# Patient Record
Sex: FEMALE
[De-identification: names, ages, dates, MRNs, and addresses within clinical notes are randomized per-mention and may not be internally consistent; named-entity substitution may affect disease eponyms.]

---

## 2020-04-04 ENCOUNTER — HOSPITAL ENCOUNTER (EMERGENCY)
Dept: HOSPITAL 56 - MW.ED | Age: 30
Discharge: HOME | End: 2020-04-04
Payer: MEDICAID

## 2020-04-04 DIAGNOSIS — R10.9: ICD-10-CM

## 2020-04-04 DIAGNOSIS — F17.210: ICD-10-CM

## 2020-04-04 DIAGNOSIS — Z3A.01: ICD-10-CM

## 2020-04-04 DIAGNOSIS — O99.331: ICD-10-CM

## 2020-04-04 DIAGNOSIS — O99.89: Primary | ICD-10-CM

## 2020-04-04 LAB
BUN SERPL-MCNC: 13 MG/DL (ref 7–18)
CHLORIDE SERPL-SCNC: 106 MMOL/L (ref 98–107)
CO2 SERPL-SCNC: 25.9 MMOL/L (ref 21–32)
GLUCOSE SERPL-MCNC: 89 MG/DL (ref 74–106)
POTASSIUM SERPL-SCNC: 3.7 MMOL/L (ref 3.5–5.1)
SODIUM SERPL-SCNC: 140 MMOL/L (ref 136–145)

## 2020-04-04 NOTE — US
First trimester obstetrical ultrasound: Multiple real-time images were

 obtained transvaginally.

 

Comparison: No previous studies for current pregnancy.

 

Dates:

Current ultrasound: JOSE 12/01/20, gestational age 5 weeks 4 days

 

Single intrauterine gestation is seen.  Yolk sac and small fetal pole 

believed to be present.  Small subchorionic hemorrhage is noted.  

Maternal ovaries appear within normal limits.

 

Measurements:

Crown-rump length: 1.85 cm - out of range

Mean sac diameter: 0.95 cm - 5 weeks 4 days

Heart rate: 101 BPM

 

Impression:

1.  Single intrauterine gestation.  Dates as noted above.

2.  Small subchorionic hemorrhage is seen.

3.  Low heart rate most likely relating to early gestational age.

 

Diagnostic code #2

 

Study was dictated in MDT

## 2020-04-04 NOTE — EDM.PDOC
ED HPI GENERAL MEDICAL PROBLEM





- General


Chief Complaint: Abdominal Pain


Stated Complaint: RT SIDE ABDOMINAL


Time Seen by Provider: 20 16:09


Source of Information: Reports: Patient


History Limitations: Reports: No Limitations





- History of Present Illness


INITIAL COMMENTS - FREE TEXT/NARRATIVE: 


HISTORY AND PHYSICAL:





History of present illness:


Patient is a  29-year-old female who presents to the ED today with concern 

of right-sided abdominal pain that began over the past 1 to 2 days.  Patient 

states that she has had at home positive pregnancy test but has not had this 

confirmed in the clinic or by ultrasound but states she believes to be about 6 

weeks based off of her last menstrual cycle.  Patient states she began having 

sharp right-sided pain which she states radiates into her vagina. Patient 

denies any other symptoms or concerns.  Patient denies any vaginal bleeding.





Patient denies fever, chills, chest pain, shortness of breath, or cough. Denies 

headache, neck stiff ness, change in vision, syncope, or near syncope. Denies 

nausea, vomiting, diarrhea, constipation, or dysuria. Has not noted any blood 

in urine or stool. Patient has been eating and drinking appropriately.





Review of systems: 


As per history of present illness and below otherwise all systems reviewed and 

negative.





Past medical history: 


As per history of present illness and as reviewed below otherwise 

noncontributory.





Surgical history: 


As per history of present illness and as reviewed below otherwise 

noncontributory.





Social history: 


See social history for further information





Family history: 


As per history of present illness and as reviewed below otherwise 

noncontributory.





Physical exam:


General: Patient is alert, oriented, and in no acute distress. Patient sitting 

comfortably on exam table.


HEENT: Atraumatic, normocephalic, pupils equal and reactive bilaterally, 

negative for conjunctival pallor or scleral icterus, mucous membranes moist, 

TMs normal bilaterally, throat clear, neck supple, nontender, trachea midline. 

No drooling or trismus noted. No meningeal signs. No hot potato voice noted. 


Lungs: Clear to auscultation, breath sounds equal bilaterally, chest nontender.


Heart: S1S2, regular rate and rhythm without overt murmur


Abdomen: Soft, nondistended, nontender. Negative for masses or 

hepatosplenomegaly. Negative for costovertebral tenderness.


Pelvis: Stable nontender.


Genitourinary: Deferred.


Rectal: Deferred.


Skin: Intact, warm, dry. No lesions or rashes noted.


Extremities: Atraumatic, negative for cords or calf pain. Neurovascular 

unremarkable.


Neuro: Awake, alert, oriented. Cranial nerves II through XII unremarkable. 

Cerebellum unremarkable. Motor and sensory unremarkable throughout. Exam 

nonfocal.





Notes:


Abdomen of exam is non tender, and without peritoneal signs. 


Discussed importance for establishing care with an OB/GYN provider as well as 

primary care provider.


Voices understanding and is agreeable to plan of care. Denies any further 

questions or concerns at this time.





Diagnostics:


CBC, CMP, UA, Uhcg, Hcg quant, blood type/RH, TVUS





Therapeutics:


None





Prescription:


None





Impression: 


Abdominal pain, unspecified


Single intrauterine pregnancy





Plan:


1. Please start and/or continue to take your prenatal vitamin with folic acid 

once daily.


2. Tylenol as needed for pain management. This is safe to use in pregnancy.


3. Follow up/establish care with an OB/GYN as discussed. Return to the ED as 

needed and as discussed.








Definitive disposition and diagnosis as appropriate pending reevaluation and 

review of above.





  ** Right abdominal pain


Pain Score (Numeric/FACES): 6





- Related Data


 Allergies











Allergy/AdvReac Type Severity Reaction Status Date / Time


 


No Known Allergies Allergy   Verified 20 16:19











Home Meds: 


 Home Meds





. [No Known Home Meds]  20 [History]











Past Medical History


OB/GYN History: Reports: Pregnancy


Psychiatric History: Reports: Anxiety, Depression





Social & Family History





- Tobacco Use


Smoking Status *Q: Current Every Day Smoker


Years of Tobacco use: 10


Packs/Tins Daily: 0.5





- Caffeine Use


Caffeine Use: Reports: Coffee, Tea





- Recreational Drug Use


Recreational Drug Use: No





ED ROS GENERAL





- Review of Systems


Review Of Systems: Comprehensive ROS is negative, except as noted in HPI.





ED EXAM, GENERAL





- Physical Exam


Exam: See Below (see dictation)





Course





- Vital Signs


Last Recorded V/S: 


 Last Vital Signs











Temp  97.3 F   20 16:20


 


Pulse  71   20 18:12


 


Resp  18   20 18:12


 


BP  113/73   20 18:12


 


Pulse Ox  99   20 18:12














- Orders/Labs/Meds


Labs: 


 Laboratory Tests











  0420 Range/Units





  16:30 16:30 16:48 


 


WBC    9.22  (4.0-11.0)  K/uL


 


RBC    4.60  (4.30-5.90)  M/uL


 


Hgb    15.1  (12.0-16.0)  g/dL


 


Hct    44.7  (36.0-46.0)  %


 


MCV    97.2  (80.0-98.0)  fL


 


MCH    32.8 H  (27.0-32.0)  pg


 


MCHC    33.8  (31.0-37.0)  g/dL


 


RDW Std Deviation    43.2  (28.0-62.0)  fl


 


RDW Coeff of Xuan    12  (11.0-15.0)  %


 


Plt Count    284  (150-400)  K/uL


 


MPV    10.20  (7.40-12.00)  fL


 


Neut % (Auto)    66.3  (48.0-80.0)  %


 


Lymph % (Auto)    25.7  (16.0-40.0)  %


 


Mono % (Auto)    5.2  (0.0-15.0)  %


 


Eos % (Auto)    2.4  (0.0-7.0)  %


 


Baso % (Auto)    0.4  (0.0-1.5)  %


 


Neut # (Auto)    6.1 H  (1.4-5.7)  K/uL


 


Lymph # (Auto)    2.4  (0.6-2.4)  K/uL


 


Mono # (Auto)    0.5  (0.0-0.8)  K/uL


 


Eos # (Auto)    0.2  (0.0-0.7)  K/uL


 


Baso # (Auto)    0.0  (0.0-0.1)  K/uL


 


Nucleated RBC %    0.0  /100WBC


 


Nucleated RBCs #    0  K/uL


 


Sodium     (136-145)  mmol/L


 


Potassium     (3.5-5.1)  mmol/L


 


Chloride     ()  mmol/L


 


Carbon Dioxide     (21.0-32.0)  mmol/L


 


BUN     (7.0-18.0)  mg/dL


 


Creatinine     (0.6-1.0)  mg/dL


 


Est Cr Clr Drug Dosing     mL/min


 


Estimated GFR (MDRD)     ml/min


 


Glucose     ()  mg/dL


 


Calcium     (8.5-10.1)  mg/dL


 


Total Bilirubin     (0.2-1.0)  mg/dL


 


AST     (15-37)  IU/L


 


ALT     (14-63)  IU/L


 


Alkaline Phosphatase     ()  U/L


 


Total Protein     (6.4-8.2)  g/dL


 


Albumin     (3.4-5.0)  g/dL


 


Globulin     (2.6-4.0)  g/dL


 


Albumin/Globulin Ratio     (0.9-1.6)  


 


HCG, Quant     mIU/mL


 


Urine Color  YELLOW    


 


Urine Appearance  CLEAR    


 


Urine pH  7.0    (5.0-8.0)  


 


Ur Specific Gravity  1.010    (1.001-1.035)  


 


Urine Protein  NEGATIVE    (NEGATIVE)  mg/dL


 


Urine Glucose (UA)  NEGATIVE    (NEGATIVE)  mg/dL


 


Urine Ketones  NEGATIVE    (NEGATIVE)  mg/dL


 


Urine Occult Blood  NEGATIVE    (NEGATIVE)  


 


Urine Nitrite  NEGATIVE    (NEGATIVE)  


 


Urine Bilirubin  NEGATIVE    (NEGATIVE)  


 


Urine Urobilinogen  0.2    (<2.0)  EU/dL


 


Ur Leukocyte Esterase  NEGATIVE    (NEGATIVE)  


 


Urine HCG, Qual   POSITIVE   (NEGATIVE)  


 


Blood Type     














  20 Range/Units





  16:48 16:48 


 


WBC    (4.0-11.0)  K/uL


 


RBC    (4.30-5.90)  M/uL


 


Hgb    (12.0-16.0)  g/dL


 


Hct    (36.0-46.0)  %


 


MCV    (80.0-98.0)  fL


 


MCH    (27.0-32.0)  pg


 


MCHC    (31.0-37.0)  g/dL


 


RDW Std Deviation    (28.0-62.0)  fl


 


RDW Coeff of Xuan    (11.0-15.0)  %


 


Plt Count    (150-400)  K/uL


 


MPV    (7.40-12.00)  fL


 


Neut % (Auto)    (48.0-80.0)  %


 


Lymph % (Auto)    (16.0-40.0)  %


 


Mono % (Auto)    (0.0-15.0)  %


 


Eos % (Auto)    (0.0-7.0)  %


 


Baso % (Auto)    (0.0-1.5)  %


 


Neut # (Auto)    (1.4-5.7)  K/uL


 


Lymph # (Auto)    (0.6-2.4)  K/uL


 


Mono # (Auto)    (0.0-0.8)  K/uL


 


Eos # (Auto)    (0.0-0.7)  K/uL


 


Baso # (Auto)    (0.0-0.1)  K/uL


 


Nucleated RBC %    /100WBC


 


Nucleated RBCs #    K/uL


 


Sodium  140   (136-145)  mmol/L


 


Potassium  3.7   (3.5-5.1)  mmol/L


 


Chloride  106   ()  mmol/L


 


Carbon Dioxide  25.9   (21.0-32.0)  mmol/L


 


BUN  13   (7.0-18.0)  mg/dL


 


Creatinine  0.7   (0.6-1.0)  mg/dL


 


Est Cr Clr Drug Dosing  91.09   mL/min


 


Estimated GFR (MDRD)  > 60.0   ml/min


 


Glucose  89   ()  mg/dL


 


Calcium  9.2   (8.5-10.1)  mg/dL


 


Total Bilirubin  0.4   (0.2-1.0)  mg/dL


 


AST  12 L   (15-37)  IU/L


 


ALT  20   (14-63)  IU/L


 


Alkaline Phosphatase  59   ()  U/L


 


Total Protein  7.3   (6.4-8.2)  g/dL


 


Albumin  3.7   (3.4-5.0)  g/dL


 


Globulin  3.6   (2.6-4.0)  g/dL


 


Albumin/Globulin Ratio  1.0   (0.9-1.6)  


 


HCG, Quant  7877.0   mIU/mL


 


Urine Color    


 


Urine Appearance    


 


Urine pH    (5.0-8.0)  


 


Ur Specific Gravity    (1.001-1.035)  


 


Urine Protein    (NEGATIVE)  mg/dL


 


Urine Glucose (UA)    (NEGATIVE)  mg/dL


 


Urine Ketones    (NEGATIVE)  mg/dL


 


Urine Occult Blood    (NEGATIVE)  


 


Urine Nitrite    (NEGATIVE)  


 


Urine Bilirubin    (NEGATIVE)  


 


Urine Urobilinogen    (<2.0)  EU/dL


 


Ur Leukocyte Esterase    (NEGATIVE)  


 


Urine HCG, Qual    (NEGATIVE)  


 


Blood Type   O POSITIVE  














Departure





- Departure


Time of Disposition: 18:33


Disposition: Home, Self-Care 01


Clinical Impression: 


 Intrauterine pregnancy





Abdominal pain


Qualifiers:


 Abdominal location: unspecified location Qualified Code(s): R10.9 - 

Unspecified abdominal pain








- Discharge Information


Referrals: 


PCP,None [Primary Care Provider] - 


Forms:  ED Department Discharge


Additional Instructions: 


The following information is given to patients seen in the emergency department 

who are being discharged to home. This information is to outline your options 

for follow-up care. We provide all patients seen in our emergency department 

with a follow-up referral.





The need for follow-up, as well as the timing and circumstances, are variable 

depending upon the specifics of your emergency department visit.





If you don't have a primary care physician on staff, we will provide you with a 

referral. We always advise you to contact your personal physician following an 

emergency department visit to inform them of the circumstance of the visit and 

for follow-up with them and/or the need for any referrals to a consulting 

specialist.





The emergency department will also refer you to a specialist when appropriate. 

This referral assures that you have the opportunity for follow-up care with a 

specialist. All of these measure are taken in an effort to provide you with 

optimal care, which includes your follow-up.





Under all circumstances we always encourage you to contact your private 

physician who remains a resource for coordinating your care. When calling for 

follow-up care, please make the office aware that this follow-up is from your 

recent emergency room visit. If for any reason you are refused follow-up, 

please contact the [facility] Emergency 

Department at [phone] and asked to speak to the emergency department 

charge nurse.





[facility]


Primary Care / Womens health


[address] [place], [place] [id number]


Phone: [phone]


Fax: [phone]





[facility]


[address] [place] [address]


Phone: [phone]


Fax: [phone]





[facility]


[address] [place] [address]


Phone: [phone]


Fax: [phone]








1. Please start and/or continue to take your prenatal vitamin with folic acid 

once daily.


2. Tylenol as needed for pain management. This is safe to use in pregnancy.


3. Follow up/establish care with an OB/GYN as discussed. Return to the ED as 

needed and as discussed.








 











Sepsis Event Note





- Evaluation


Sepsis Screening Result: No Definite Risk





- Focused Exam


Vital Signs: 


 Vital Signs











  Temp Pulse Resp BP Pulse Ox


 


 20 18:12   71  18  113/73  99


 


 20 16:20  97.3 F  75  17  104/71  96











Date Exam was Performed: 20


Time Exam was Performed: 18:31